# Patient Record
Sex: FEMALE | Race: WHITE
[De-identification: names, ages, dates, MRNs, and addresses within clinical notes are randomized per-mention and may not be internally consistent; named-entity substitution may affect disease eponyms.]

---

## 2019-03-25 ENCOUNTER — HOSPITAL ENCOUNTER (OUTPATIENT)
Dept: HOSPITAL 76 - LAB.WCP | Age: 54
Discharge: HOME | End: 2019-03-25
Attending: INTERNAL MEDICINE
Payer: MEDICARE

## 2019-03-25 ENCOUNTER — HOSPITAL ENCOUNTER (OUTPATIENT)
Dept: HOSPITAL 76 - DI.WCP | Age: 54
Discharge: HOME | End: 2019-03-25
Attending: INTERNAL MEDICINE
Payer: MEDICARE

## 2019-03-25 DIAGNOSIS — M19.042: Primary | ICD-10-CM

## 2019-03-25 DIAGNOSIS — M15.9: ICD-10-CM

## 2019-03-25 DIAGNOSIS — M25.50: ICD-10-CM

## 2019-03-25 DIAGNOSIS — M25.50: Primary | ICD-10-CM

## 2019-03-25 DIAGNOSIS — M18.12: ICD-10-CM

## 2019-03-25 DIAGNOSIS — M19.041: ICD-10-CM

## 2019-03-25 LAB
ALBUMIN DIAFP-MCNC: 4.1 G/DL (ref 3.2–5.5)
ALBUMIN/GLOB SERPL: 1.8 {RATIO} (ref 1–2.2)
ALP SERPL-CCNC: 58 IU/L (ref 42–121)
ALT SERPL W P-5'-P-CCNC: 16 IU/L (ref 10–60)
ANION GAP SERPL CALCULATED.4IONS-SCNC: 8 MMOL/L (ref 6–13)
AST SERPL W P-5'-P-CCNC: 21 IU/L (ref 10–42)
BASOPHILS NFR BLD AUTO: 0 10^3/UL (ref 0–0.1)
BASOPHILS NFR BLD AUTO: 0.8 %
BILIRUB BLD-MCNC: 0.4 MG/DL (ref 0.2–1)
BUN SERPL-MCNC: 11 MG/DL (ref 6–20)
CALCIUM UR-MCNC: 8.8 MG/DL (ref 8.5–10.3)
CHLORIDE SERPL-SCNC: 100 MMOL/L (ref 101–111)
CO2 SERPL-SCNC: 29 MMOL/L (ref 21–32)
CREAT SERPLBLD-SCNC: 0.7 MG/DL (ref 0.4–1)
CRP SERPL-MCNC: 1.1 MG/DL (ref 0–1)
EOSINOPHIL # BLD AUTO: 0.1 10^3/UL (ref 0–0.7)
EOSINOPHIL NFR BLD AUTO: 2.4 %
ERYTHROCYTE [DISTWIDTH] IN BLOOD BY AUTOMATED COUNT: 15.2 % (ref 12–15)
GFRSERPLBLD MDRD-ARVRAT: 88 ML/MIN/{1.73_M2} (ref 89–?)
GLOBULIN SER-MCNC: 2.3 G/DL (ref 2.1–4.2)
GLUCOSE SERPL-MCNC: 105 MG/DL (ref 70–100)
HGB UR QL STRIP: 15.6 G/DL (ref 12–16)
LYMPHOCYTES # SPEC AUTO: 1.4 10^3/UL (ref 1.5–3.5)
LYMPHOCYTES NFR BLD AUTO: 30.3 %
MCH RBC QN AUTO: 31.1 PG (ref 27–31)
MCHC RBC AUTO-ENTMCNC: 33 G/DL (ref 32–36)
MCV RBC AUTO: 94.1 FL (ref 81–99)
MONOCYTES # BLD AUTO: 0.4 10^3/UL (ref 0–1)
MONOCYTES NFR BLD AUTO: 8.8 %
NEUTROPHILS # BLD AUTO: 2.6 10^3/UL (ref 1.5–6.6)
NEUTROPHILS # SNV AUTO: 4.5 X10^3/UL (ref 4.8–10.8)
NEUTROPHILS NFR BLD AUTO: 57.7 %
PDW BLD AUTO: 8.7 FL (ref 7.9–10.8)
PLATELET # BLD: 206 10^3/UL (ref 130–450)
PROT SPEC-MCNC: 6.4 G/DL (ref 6.7–8.2)
RBC MAR: 5.03 10^6/UL (ref 4.2–5.4)
RHEUMATOID FACT SER QL: NEGATIVE
SODIUM SERPLBLD-SCNC: 137 MMOL/L (ref 135–145)

## 2019-03-25 PROCEDURE — 86200 CCP ANTIBODY: CPT

## 2019-03-25 PROCEDURE — 85025 COMPLETE CBC W/AUTO DIFF WBC: CPT

## 2019-03-25 PROCEDURE — 86430 RHEUMATOID FACTOR TEST QUAL: CPT

## 2019-03-25 PROCEDURE — 86038 ANTINUCLEAR ANTIBODIES: CPT

## 2019-03-25 PROCEDURE — 80053 COMPREHEN METABOLIC PANEL: CPT

## 2019-03-25 PROCEDURE — 85651 RBC SED RATE NONAUTOMATED: CPT

## 2019-03-25 PROCEDURE — 36415 COLL VENOUS BLD VENIPUNCTURE: CPT

## 2019-03-25 PROCEDURE — 86140 C-REACTIVE PROTEIN: CPT

## 2019-03-25 NOTE — XRAY REPORT
Reason:  BILATERAL HAND PAIN

Procedure Date:  03/25/2019   

Accession Number:  [id number] / [id number]                    

Procedure:  WCP - Hand 2 View BILAT CPT Code:  

 

FULL RESULT:

 

 

EXAMS:

1. Right Hand Radiography

2. Left Hand Radiography

 

EXAM DATE: 3/25/2019 12:22 PM.

 

CLINICAL HISTORY: Bilateral hand pain.

 

COMPARISON: HAND 3 VIEW RT 04/12/2016 5:35 PM.

 

TECHNIQUE: 2 views each hand.

 

FINDINGS:

Right:

Bones: Normal. No fractures or bone lesions.

 

Joints: Narrowing of interphalangeal joints, distal greater than proximal 

has progressed compared to 2016.

 

Soft Tissues: Normal. No soft tissue swelling.

 

Left:

Bones: Normal. No fractures or bone lesions.

 

Joints: Mild to moderate degenerative changes of the first 

carpometacarpal joint. Joint space narrowing of interphalangeal joints, 

distal greater than proximal, without definite erosions.

 

Soft Tissues: Normal. No soft tissue swelling.

IMPRESSION: Degenerative changes, osteoarthrosis pattern progressed 

compared to 2016.

 

[facility]

## 2019-03-28 LAB — ANA SER QL: POSITIVE

## 2021-11-10 ENCOUNTER — HOSPITAL ENCOUNTER (OUTPATIENT)
Dept: HOSPITAL 76 - LAB.WCP | Age: 56
End: 2021-11-10
Attending: INTERNAL MEDICINE
Payer: MEDICARE

## 2021-11-10 DIAGNOSIS — M06.4: ICD-10-CM

## 2021-11-10 DIAGNOSIS — M25.50: Primary | ICD-10-CM

## 2021-11-10 LAB
ALBUMIN DIAFP-MCNC: 4.5 G/DL (ref 3.2–5.5)
ALBUMIN/GLOB SERPL: 2.6 {RATIO} (ref 1–2.2)
ALP SERPL-CCNC: 69 IU/L (ref 42–121)
ALT SERPL W P-5'-P-CCNC: 21 IU/L (ref 10–60)
ANION GAP SERPL CALCULATED.4IONS-SCNC: 10 MMOL/L (ref 6–13)
AST SERPL W P-5'-P-CCNC: 25 IU/L (ref 10–42)
BASOPHILS NFR BLD AUTO: 0 10^3/UL (ref 0–0.1)
BASOPHILS NFR BLD AUTO: 0.7 %
BILIRUB BLD-MCNC: 0.8 MG/DL (ref 0.2–1)
BUN SERPL-MCNC: 17 MG/DL (ref 6–20)
CALCIUM UR-MCNC: 9 MG/DL (ref 8.5–10.3)
CHLORIDE SERPL-SCNC: 101 MMOL/L (ref 101–111)
CO2 SERPL-SCNC: 25 MMOL/L (ref 21–32)
CREAT SERPLBLD-SCNC: 0.7 MG/DL (ref 0.4–1)
EOSINOPHIL # BLD AUTO: 0.1 10^3/UL (ref 0–0.7)
EOSINOPHIL NFR BLD AUTO: 2.4 %
ERYTHROCYTE [DISTWIDTH] IN BLOOD BY AUTOMATED COUNT: 12.5 % (ref 12–15)
GFRSERPLBLD MDRD-ARVRAT: 87 ML/MIN/{1.73_M2} (ref 89–?)
GLOBULIN SER-MCNC: 1.7 G/DL (ref 2.1–4.2)
GLUCOSE SERPL-MCNC: 96 MG/DL (ref 70–100)
HCT VFR BLD AUTO: 44.9 % (ref 37–47)
HGB UR QL STRIP: 14.5 G/DL (ref 12–16)
LYMPHOCYTES # SPEC AUTO: 1.1 10^3/UL (ref 1.5–3.5)
LYMPHOCYTES NFR BLD AUTO: 23.4 %
MCH RBC QN AUTO: 31.5 PG (ref 27–31)
MCHC RBC AUTO-ENTMCNC: 32.3 G/DL (ref 32–36)
MCV RBC AUTO: 97.6 FL (ref 81–99)
MONOCYTES # BLD AUTO: 0.4 10^3/UL (ref 0–1)
MONOCYTES NFR BLD AUTO: 8.2 %
NEUTROPHILS # BLD AUTO: 3 10^3/UL (ref 1.5–6.6)
NEUTROPHILS # SNV AUTO: 4.6 X10^3/UL (ref 4.8–10.8)
NEUTROPHILS NFR BLD AUTO: 65.1 %
NRBC # BLD AUTO: 0 /100WBC
NRBC # BLD AUTO: 0 X10^3/UL
PDW BLD AUTO: 11.4 FL (ref 7.9–10.8)
PLATELET # BLD: 159 10^3/UL (ref 130–450)
POTASSIUM SERPL-SCNC: 3.8 MMOL/L (ref 3.5–5)
PROT SPEC-MCNC: 6.2 G/DL (ref 6.7–8.2)
RBC MAR: 4.6 10^6/UL (ref 4.2–5.4)
RHEUMATOID FACT SER QL: NEGATIVE
SODIUM SERPLBLD-SCNC: 136 MMOL/L (ref 135–145)

## 2021-11-10 PROCEDURE — 36415 COLL VENOUS BLD VENIPUNCTURE: CPT

## 2021-11-10 PROCEDURE — 85651 RBC SED RATE NONAUTOMATED: CPT

## 2021-11-10 PROCEDURE — 80053 COMPREHEN METABOLIC PANEL: CPT

## 2021-11-10 PROCEDURE — 85025 COMPLETE CBC W/AUTO DIFF WBC: CPT

## 2021-11-10 PROCEDURE — 86200 CCP ANTIBODY: CPT

## 2021-11-10 PROCEDURE — 86430 RHEUMATOID FACTOR TEST QUAL: CPT
